# Patient Record
Sex: MALE | Race: BLACK OR AFRICAN AMERICAN | NOT HISPANIC OR LATINO | Employment: FULL TIME | URBAN - METROPOLITAN AREA
[De-identification: names, ages, dates, MRNs, and addresses within clinical notes are randomized per-mention and may not be internally consistent; named-entity substitution may affect disease eponyms.]

---

## 2019-10-01 PROBLEM — J45.20 MILD INTERMITTENT ASTHMA WITHOUT COMPLICATION: Status: ACTIVE | Noted: 2019-10-01

## 2019-10-01 PROBLEM — R06.2 WHEEZING: Status: ACTIVE | Noted: 2019-10-01

## 2019-10-01 PROBLEM — F17.200 TOBACCO DEPENDENCE: Status: ACTIVE | Noted: 2019-10-01

## 2019-10-05 PROBLEM — R80.8 OTHER PROTEINURIA: Status: ACTIVE | Noted: 2019-10-05

## 2019-10-05 PROBLEM — R73.9 HYPERGLYCEMIA: Status: ACTIVE | Noted: 2019-10-05

## 2019-10-05 PROBLEM — E78.1 HYPERTRIGLYCERIDEMIA: Status: ACTIVE | Noted: 2019-10-05

## 2020-05-12 PROBLEM — N17.9 ACUTE RENAL FAILURE: Status: ACTIVE | Noted: 2020-05-12

## 2020-05-12 PROBLEM — E11.29 TYPE 2 DIABETES MELLITUS WITH MICROALBUMINURIA, WITHOUT LONG-TERM CURRENT USE OF INSULIN: Status: ACTIVE | Noted: 2020-05-12

## 2020-05-12 PROBLEM — E11.9 TYPE 2 DIABETES MELLITUS WITHOUT COMPLICATION, WITHOUT LONG-TERM CURRENT USE OF INSULIN: Status: ACTIVE | Noted: 2020-05-12

## 2020-05-12 PROBLEM — R80.9 TYPE 2 DIABETES MELLITUS WITH MICROALBUMINURIA, WITHOUT LONG-TERM CURRENT USE OF INSULIN: Status: ACTIVE | Noted: 2020-05-12

## 2020-05-19 PROBLEM — R07.89 OTHER CHEST PAIN: Status: ACTIVE | Noted: 2020-05-19

## 2020-06-16 DIAGNOSIS — E11.29 TYPE 2 DIABETES MELLITUS WITH MICROALBUMINURIA, WITHOUT LONG-TERM CURRENT USE OF INSULIN: Primary | ICD-10-CM

## 2020-06-16 DIAGNOSIS — R80.9 TYPE 2 DIABETES MELLITUS WITH MICROALBUMINURIA, WITHOUT LONG-TERM CURRENT USE OF INSULIN: Primary | ICD-10-CM

## 2020-06-29 ENCOUNTER — OFFICE VISIT (OUTPATIENT)
Dept: PODIATRY | Facility: CLINIC | Age: 60
End: 2020-06-29
Payer: COMMERCIAL

## 2020-06-29 VITALS
HEIGHT: 67 IN | SYSTOLIC BLOOD PRESSURE: 121 MMHG | DIASTOLIC BLOOD PRESSURE: 70 MMHG | BODY MASS INDEX: 30.63 KG/M2 | HEART RATE: 94 BPM | WEIGHT: 195.13 LBS

## 2020-06-29 DIAGNOSIS — F17.200 TOBACCO DEPENDENCE: ICD-10-CM

## 2020-06-29 DIAGNOSIS — R80.9 TYPE 2 DIABETES MELLITUS WITH MICROALBUMINURIA, WITHOUT LONG-TERM CURRENT USE OF INSULIN: Primary | ICD-10-CM

## 2020-06-29 DIAGNOSIS — E11.29 TYPE 2 DIABETES MELLITUS WITH MICROALBUMINURIA, WITHOUT LONG-TERM CURRENT USE OF INSULIN: Primary | ICD-10-CM

## 2020-06-29 DIAGNOSIS — L84 PRE-ULCERATIVE CORN OR CALLOUS: ICD-10-CM

## 2020-06-29 PROCEDURE — 3046F HEMOGLOBIN A1C LEVEL >9.0%: CPT | Mod: CPTII,S$GLB,, | Performed by: PODIATRIST

## 2020-06-29 PROCEDURE — 3046F PR MOST RECENT HEMOGLOBIN A1C LEVEL > 9.0%: ICD-10-PCS | Mod: CPTII,S$GLB,, | Performed by: PODIATRIST

## 2020-06-29 PROCEDURE — 3008F BODY MASS INDEX DOCD: CPT | Mod: CPTII,S$GLB,, | Performed by: PODIATRIST

## 2020-06-29 PROCEDURE — 3008F PR BODY MASS INDEX (BMI) DOCUMENTED: ICD-10-PCS | Mod: CPTII,S$GLB,, | Performed by: PODIATRIST

## 2020-06-29 PROCEDURE — 99203 PR OFFICE/OUTPT VISIT, NEW, LEVL III, 30-44 MIN: ICD-10-PCS | Mod: S$GLB,,, | Performed by: PODIATRIST

## 2020-06-29 PROCEDURE — 99203 OFFICE O/P NEW LOW 30 MIN: CPT | Mod: S$GLB,,, | Performed by: PODIATRIST

## 2020-06-29 PROCEDURE — 99999 PR PBB SHADOW E&M-EST. PATIENT-LVL III: ICD-10-PCS | Mod: PBBFAC,,, | Performed by: PODIATRIST

## 2020-06-29 PROCEDURE — 99999 PR PBB SHADOW E&M-EST. PATIENT-LVL III: CPT | Mod: PBBFAC,,, | Performed by: PODIATRIST

## 2020-06-29 NOTE — LETTER
June 29, 2020      [facility]  [address] [facility] [address] [address]  [place] [address]  Phone: [phone]  Fax: [phone]       Patient: Yan Lyons   YOB: 1960  Date of Visit: 06/29/2020    To Whom It May Concern:    JOHN Lyons  was at [facility] on 06/29/2020. He may return to work on 07/02/2020 with no restrictions. If you have any questions or concerns, or if I can be of further assistance, please do not hesitate to contact me.    Sincerely,        Florence Castillo LPN

## 2020-06-29 NOTE — PROGRESS NOTES
Subjective:     Patient ID: Yan Lyons is a 59 y.o. male.    Chief Complaint: Callouses (B/L callouses. Diabetic pt. Rates pain 4/10 ,Wears casual shoes. c/o no pain. PCP katiuska Lua visit 6/23/2020)    Yan is a 59 y.o. male who presents to the clinic upon referral from Dr. Tejeda  for evaluation and treatment of diabetic feet. Yan has no past medical history on file. Patient relates no major problem with feet. Only complaints today consist of corns on the bottom of both feet. Patient admits smoking 7-8 cigarettes a day. Patient states his blood sugar runs between 100-160mg/dl.     PCP: Bailey Tejeda MD    Date Last Seen by PCP: 06/23/2020    Current shoe gear: Casual shoes    Hemoglobin A1C   Date Value Ref Range Status   05/13/2020 13.3 (H) 4.8 - 5.6 % Final     Comment:              Prediabetes: 5.7 - 6.4           Diabetes: >6.4           Glycemic control for adults with diabetes: <7.0     05/05/2020 13.9 (H) <=6.5 % Final       Patient Active Problem List   Diagnosis    Wheezing    Mild intermittent asthma without complication    Tobacco dependence    Other proteinuria    Hyperglycemia    Hypertriglyceridemia    Type 2 diabetes mellitus with microalbuminuria, without long-term current use of insulin    Acute renal failure    Other chest pain       Medication List with Changes/Refills   Current Medications    ASPIRIN 81 MG CHEW    Take 81 mg by mouth once daily.    ATORVASTATIN (LIPITOR) 10 MG TABLET    Take 1 tablet (10 mg total) by mouth every evening.    BLOOD SUGAR DIAGNOSTIC STRP    Check bs bid    BLOOD-GLUCOSE METER KIT    Use as instructed    GLIMEPIRIDE (AMARYL) 4 MG TABLET    Two po qd    LANCETS (LANCETS,ULTRA THIN) MISC    Use bid    METFORMIN (GLUCOPHAGE-XR) 500 MG XR 24HR TABLET    4tabs poqd  With dinner    MUPIROCIN CALCIUM 2% (BACTROBAN) 2 % CREAM    Apply bid to rash    PIOGLITAZONE (ACTOS) 45 MG TABLET    Take 1 tablet (45 mg total) by mouth once daily.     "TADALAFIL (CIALIS) 20 MG TAB    Use one tablet every 36 hours       Review of patient's allergies indicates:  No Known Allergies    Past Surgical History:   Procedure Laterality Date    APPENDECTOMY      HERNIA REPAIR         Family History   Problem Relation Age of Onset    Hypertension Mother     Diabetes Father     Hypertension Father     COPD Neg Hx        Social History     Socioeconomic History    Marital status: Legally      Spouse name: Not on file    Number of children: Not on file    Years of education: Not on file    Highest education level: Not on file   Occupational History    Not on file   Social Needs    Financial resource strain: Not on file    Food insecurity     Worry: Not on file     Inability: Not on file    Transportation needs     Medical: Not on file     Non-medical: Not on file   Tobacco Use    Smoking status: Current Every Day Smoker    Smokeless tobacco: Never Used   Substance and Sexual Activity    Alcohol use: Yes    Drug use: Yes     Types: Marijuana    Sexual activity: Yes   Lifestyle    Physical activity     Days per week: Not on file     Minutes per session: Not on file    Stress: Not on file   Relationships    Social connections     Talks on phone: Not on file     Gets together: Not on file     Attends Worship service: Not on file     Active member of club or organization: Not on file     Attends meetings of clubs or organizations: Not on file     Relationship status: Not on file   Other Topics Concern    Not on file   Social History Narrative    Not on file       Vitals:    06/29/20 1331   BP: 121/70   Pulse: 94   Weight: 88.5 kg (195 lb 1.7 oz)   Height: 5' 7" (1.702 m)   PainSc:   4       Hemoglobin A1C   Date Value Ref Range Status   05/13/2020 13.3 (H) 4.8 - 5.6 % Final     Comment:              Prediabetes: 5.7 - 6.4           Diabetes: >6.4           Glycemic control for adults with diabetes: <7.0     05/05/2020 13.9 (H) <=6.5 % Final "       Review of Systems   Constitutional: Negative for chills and fever.   Respiratory: Negative for shortness of breath.    Cardiovascular: Negative for chest pain, palpitations, orthopnea, claudication and leg swelling.   Gastrointestinal: Negative for diarrhea, nausea and vomiting.   Musculoskeletal: Negative for joint pain.   Skin: Negative for rash.   Neurological: Negative for dizziness, tingling, sensory change, focal weakness and weakness.   Psychiatric/Behavioral: Negative.              Objective:      PHYSICAL EXAM: Apperance: Alert and orient in no distress,well developed, and with good attention to grooming and body habits  Patient presents ambulating in casual shoes.   LOWER EXTREMITY EXAM:  VASCULAR: Dorsalis pedis pulses 2/4 bilateral and Posterior Tibial pulses 2/4 bilateral. Capillary fill time <4 seconds bilateral. No edema observed bilateral. Varicosities absent bilateral. Skin temperature of the lower extremities is warm to warm, proximal to distal. Hair growth WNL bilateral.  DERMATOLOGICAL: No skin rashes, subcutaneous nodules, lesions, or ulcers observed bilateral. Nails 1,2,3,4,5 bilateral normal length and thickness. Webspaces 1,2,3,4 bilateral clean, dry and without evidence of break in skin integrity. Mild hyperkeratotic tissue noted to left plantar hallux and 4th toe, right plantar 3rd submetatarsal.   NEUROLOGICAL: Light touch, sharp-dull, proprioception all present and equal bilaterally.  Vibratory sensation intact at bilateral hallux. Protective sensation intact at all 10 sites as tested with a [place]wood-Bette 5.07 monofilament.   MUSCULOSKELETAL: Muscle strength is 5/5 for foot inverters, everters, plantarflexors, and dorsiflexors. Muscle tone is normal.           Assessment:       Encounter Diagnoses   Name Primary?    Type 2 diabetes mellitus with microalbuminuria, without long-term current use of insulin Yes    Tobacco dependence     Pre-ulcerative corn or callous           Plan:   Type 2 diabetes mellitus with microalbuminuria, without long-term current use of insulin    Tobacco dependence    Pre-ulcerative corn or callous  -     Ambulatory referral/consult to Podiatry      I counseled the patient on his conditions, regarding findings of my examination, my impressions, and usual treatment plan.   Greater than 50% of this visit spent on counseling and coordination of care.  Greater than 15 minutes of a 20 minute appointment spent on education about the diabetic foot, neuropathy, and prevention of limb loss.  Shoe inspection. Diabetic Foot Education. Patient reminded of the importance of good nutrition and blood sugar control to help prevent podiatric complications of diabetes. Patient instructed on proper foot hygeine. We discussed wearing proper shoe gear, daily foot inspections, never walking without protective shoe gear, never putting sharp instruments to feet.    Patient  will continue to monitor the areas daily, inspect feet, wear protective shoe gear when ambulatory, moisturizer to maintain skin integrity. Patient reminded of the importance of good nutrition and blood sugar control to help prevent podiatric complications of diabetes.  Patient to return 2 months or sooner if needed.     Patient to return as PROC B for nails and callus.             Rachelle Wise DPM  [facility]

## 2020-06-29 NOTE — LETTER
June 29, 2020      Bailey Tejeda MD  [address] [facility] [address]           [facility]  [address] [facility] [address]  Phone: [phone]  Fax: [phone]          Patient: Yan Lyons   MR Number: [id number]   YOB: 1960   Date of Visit: 6/29/2020       Dear Dr. Bailey Tejeda:    Thank you for referring Yan Lyons to me for evaluation. Attached you will find relevant portions of my assessment and plan of care.    If you have questions, please do not hesitate to call me. I look forward to following Yan Lyons along with you.    Sincerely,    Rachelle Wise DPM    Enclosure  CC:  No Recipients    If you would like to receive this communication electronically, please contact [email or web address] or [phone] to request more information on [facility] access.    For providers and/or their staff who would like to refer a patient to [facility], please contact us through our one-stop-shop provider referral line, [facility], at [phone].    If you feel you have received this communication in error or would no longer like to receive these types of communications, please e-mail [email or web address]

## 2020-07-16 NOTE — PROGRESS NOTES
"Diabetes Education  Author: Rosalie Couch, PAIGE  Date: 7/17/2020    Diabetes Care Management Summary  Diabetes Education Record Assessment/Progress: Initial  Current Diabetes Risk Level: High     Referring Provider: Bailey Tejeda MD  59 y.o. male in clinic today for diabetes education.   Pt is newly diagnosed with DM. He has been to CDE at [facility] ~2 wks ago.   He is not sure how he was scheduled for both [facility] and [facility].   At [facility] 2 wks ago, he did learn about diet and how it affects BG.     Weight: 85.7 kg (188 lb 15 oz)   Height: 5' 7" (170.2 cm)   Body mass index is 29.59 kg/m².    Lab Results   Component Value Date    HGBA1C 13.3 (H) 05/13/2020       Diabetes Type  Diabetes Type : Type II    Diabetes History  Diabetes Diagnosis: 0-1 year(April 2020)  Current Treatment: Oral Medication, Diet(metformin ER, 2000mg // glimepiride, 4 mg daily // Actos, 45mg in evening)  Reviewed Problem List with Patient: Yes    Health Maintenance was reviewed today with patient. Discussed with patient importance of routine eye exams, foot exams/foot care, blood work (i.e.: A1c, microalbumin, and lipid), dental visits, yearly flu vaccine, and pneumonia vaccine as indicated by PCP. Patient verbalized understanding.     Health Maintenance Topics with due status: Not Due       Topic Last Completion Date    Lipid Panel 05/13/2020    Hemoglobin A1c 05/13/2020    Low Dose Statin 06/29/2020    Foot Exam 06/29/2020    Influenza Vaccine Not Due     Health Maintenance Due   Topic Date Due    Eye Exam  10/13/1971    Urine Microalbumin  10/13/1971    TETANUS VACCINE  10/13/1979    Pneumococcal Vaccine (Medium Risk) (1 of 1 - PPSV23) 10/13/1980    Shingles Vaccine (1 of 2) 10/13/2011    Colorectal Cancer Screening  10/13/2011       Nutrition  Meal Planning: 3 meals per day, water  What type of beverages do you drink?: water  Meal Plan 24 Hour Recall - Breakfast: salad with feta cheese and vinagrette  Meal Plan 24 Hour Recall - Lunch: " chicken salad, French fries, grilled chicken sandwich, water  Meal Plan 24 Hour Recall - Dinner: lobster salad, scalloped potatoes, salmon, Crystal light  Meal Plan 24 Hour Recall - Snack: orange or apple    Pt has completely changed diet since his visit with CDE at [facility]  Before this, he consumed mostly junk foods, including candy and sweets and drank excessive amounts of soft drinks.   He is now cooking his meals and having 3 meals a day. Mostly drinking water or other SF beverages. He has cut out junk foods.     Monitoring   Self Monitoring : checking bid, fasting and 2 hr pp // fasting in 90's // pp in 120's   per pt recall  Blood Glucose Logs: No  Do you use a personal continuous glucose monitor?: No  In the last month, how often have you had a low blood sugar reaction?: never  Can you tell when your blood sugar is too high?: sometimes(went to party and drank 2 soft drinks - felt dizzy and sick)    Exercise   Exercise Type: none(walks a lot in job - at least 2 miles per day)    Current Diabetes Treatment   Current Treatment: Oral Medication, Diet(metformin ER, 2000mg // glimepiride, 4 mg daily // Actos, 45mg in evening)    Social History  Primary Support: Self  Occupation: works at car lot over [employer]                                     Readiness to Learn   Readiness to Learn : Eager    Cultural Influences  Cultural Influences: No    Diabetes Education Assessment/Progress  Diabetes Disease Process (diabetes disease process and treatment options): Discussion, Individual Session  Nutrition (Incorporating nutritional management into one's lifestyle): Discussion, Individual Session, Written Materials Provided  Physical Activity (incorporating physical activity into one's lifestyle): Individual Session, Discussion  Medications (states correct name, dose, onset, peak, duration, side effects & timing of meds): Individual Session, Discussion, Instructed  Monitoring (monitoring blood glucose/other parameters & using  results): Discussion, Individual Session  Acute Complications (preventing, detecting, and treating acute complications): Not Covered/Deferred  Chronic Complications (preventing, detecting, and treating chronic complications): Not Covered/Deferred  Clinical (diabetes, other pertinent medical history, and relevant comorbidities reviewed during visit): Discussion, Individual Session  Cognitive (knowledge of self-management skills, functional health literacy): Discussion, Individual Session  Psychosocial (emotional response to diabetes): Not Covered/Deferred  Diabetes Distress and Support Systems: Not Covered/Deferred  Behavioral (readiness for change, lifestyle practices, self-care behaviors): Discussion, Individual Session    Goals  Patient has selected/evaluated goals during today's session: Yes, selected  Healthy Eating: Set(Use food label to find total carbohydrate per serving, instead of sugars only)  Start Date: 07/17/20         Diabetes Care Plan/Intervention  Education Plan/Intervention: Endocrine Provider Visit Set Up, Other(pt has f/u with PCP on 8/31/20 // scheduled appt with Stanley Delgadillo in Sept - if PCP feels he doesn't need this appt, pt can cancel. // he is to f/u with CDE at [facility] for education)    Diabetes Meal Plan  Carbohydrate Per Meal: 45-60g  Carbohydrate Per Snack : 15-20g    Today's Self-Management Care Plan was developed with the patient's input and is based on barriers identified during today's assessment.    The long and short-term goals in the care plan were written with the patient/caregiver's input. The patient has agreed to work toward these goals to improve his overall diabetes control.      The patient received a copy of today's self-management plan and verbalized understanding of the care plan, goals, and all of today's instructions.      The patient was encouraged to communicate with his physician and care team regarding his condition(s) and treatment.  I provided the patient with  my contact information today and encouraged him to contact me via phone or patient portal as needed.     Education Units of Time   Time Spent: 45 min

## 2020-07-17 ENCOUNTER — CLINICAL SUPPORT (OUTPATIENT)
Dept: DIABETES | Facility: CLINIC | Age: 60
End: 2020-07-17
Payer: COMMERCIAL

## 2020-07-17 VITALS — WEIGHT: 188.94 LBS | HEIGHT: 67 IN | BODY MASS INDEX: 29.65 KG/M2

## 2020-07-17 DIAGNOSIS — R80.9 TYPE 2 DIABETES MELLITUS WITH MICROALBUMINURIA, WITHOUT LONG-TERM CURRENT USE OF INSULIN: Primary | ICD-10-CM

## 2020-07-17 DIAGNOSIS — E11.29 TYPE 2 DIABETES MELLITUS WITH MICROALBUMINURIA, WITHOUT LONG-TERM CURRENT USE OF INSULIN: Primary | ICD-10-CM

## 2020-07-17 PROCEDURE — 99999 PR PBB SHADOW E&M-EST. PATIENT-LVL II: CPT | Mod: PBBFAC,,, | Performed by: DIETITIAN, REGISTERED

## 2020-07-17 PROCEDURE — G0108 PR DIAB MANAGE TRN  PER INDIV: ICD-10-PCS | Mod: S$GLB,,, | Performed by: DIETITIAN, REGISTERED

## 2020-07-17 PROCEDURE — 99999 PR PBB SHADOW E&M-EST. PATIENT-LVL II: ICD-10-PCS | Mod: PBBFAC,,, | Performed by: DIETITIAN, REGISTERED

## 2020-07-17 PROCEDURE — G0108 DIAB MANAGE TRN  PER INDIV: HCPCS | Mod: S$GLB,,, | Performed by: DIETITIAN, REGISTERED

## 2020-07-17 NOTE — LETTER
July 17, 2020        Bailey Tejeda MD  [address] [facility] [address]             [facility]  [address] [facility] [address]  Phone: [phone]  Fax: [phone]   Patient: Yan Lyons   MR Number: [id number]   YOB: 1960   Date of Visit: 7/17/2020       Dear Dr. Tejeda:    Thank you for referring Yan Lyons to me for evaluation. Below are the relevant portions of my assessment and plan of care.            If you have questions, please do not hesitate to call me. I look forward to following Yan along with you.    Sincerely,      Rosalie Couch RD           CC  No Recipients

## 2020-09-23 PROBLEM — N28.9 RENAL INSUFFICIENCY: Status: ACTIVE | Noted: 2020-09-23

## 2021-04-28 ENCOUNTER — PATIENT MESSAGE (OUTPATIENT)
Dept: RESEARCH | Facility: HOSPITAL | Age: 61
End: 2021-04-28

## 2022-09-30 ENCOUNTER — PATIENT MESSAGE (OUTPATIENT)
Dept: RESEARCH | Facility: HOSPITAL | Age: 62
End: 2022-09-30
Payer: COMMERCIAL